# Patient Record
(demographics unavailable — no encounter records)

---

## 2025-03-12 NOTE — HISTORY OF PRESENT ILLNESS
[FreeTextEntry1] : FREDERICK STORM is a 24 year F presenting with symptomatic macromastia, requesting consultation for breast reduction surgery. Patient states that she suffers from long standing neck, back and shoulder pain, with bra strap grooving from her heavy breasts for many years. Patient complains that she has difficulty fitting into clothing and that her large breasts interfere with exercise. 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 She as well c/o poor posture due to her heavy breasts pulling her forward.    Patient has attempted conservative treatment i.e. supportive garments, over the counter pain medication,  without any resolution to the pain. These conservative treatments have been attempted for over 6 months. Her bra size has been stable for at least 6 months   PMHx- denied   PSHx- endoscopy    Currently taking - denied   Allergies- NKDA   Never smoker   Family history- denied    Work history - PCA at [employer]    She is unsure of her current bra size   She denies any previous breast imaging.

## 2025-03-12 NOTE — PHYSICAL EXAM
[NI] : Normal [de-identified] : NAD, AxOx3  [de-identified] : nonlabored breathing  [de-identified] : normal HR [de-identified] : RIGHT: SN-N 39.5, N-IMF 17, BW 17 LEFT: SN-N 40, N-IMF 20, BW 18 No masses  No nipple discharge nor retraction  Grade 3 ptosis bilaterally  Striae is present  Bra strap grooving is noted  [de-identified] : no edema  [de-identified] : as above [de-identified] : grossly intact [de-identified] : normal affect  12-Feb-2018

## 2025-03-12 NOTE — HISTORY OF PRESENT ILLNESS
[FreeTextEntry1] : FREDERICK STORM is a 24 year F presenting with symptomatic macromastia, requesting consultation for breast reduction surgery. Patient states that she suffers from long standing neck, back and shoulder pain, with bra strap grooving from her heavy breasts for many years. Patient complains that she has difficulty fitting into clothing and that her large breasts interfere with exercise. 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 She as well c/o poor posture due to her heavy breasts pulling her forward.    Patient has attempted conservative treatment i.e. supportive garments, over the counter pain medication,  without any resolution to the pain. These conservative treatments have been attempted for over 6 months. 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

## 2025-03-12 NOTE — PHYSICAL EXAM
[NI] : Normal [de-identified] : NAD, AxOx3  [de-identified] : nonlabored breathing  [de-identified] : normal HR [de-identified] : RIGHT: SN-N 39.5, N-IMF 17, BW 17 LEFT: SN-N 40, N-IMF 20, BW 18 No masses  No nipple discharge nor retraction  Grade 3 ptosis bilaterally  Striae is present  Bra strap grooving is noted  [de-identified] : no edema  [de-identified] : as above [de-identified] : grossly intact [de-identified] : normal affect

## 2025-03-12 NOTE — ADDENDUM
[FreeTextEntry1] :  I, Larisa Fontenot, documented this note as a scribe on behalf of Dr. Lauren Shikowitz-Behr, MD on 03/04/2025.

## 2025-03-12 NOTE — HISTORY OF PRESENT ILLNESS
[FreeTextEntry1] : FREDERICK STORM is a 24 year F presenting with symptomatic macromastia, requesting consultation for breast reduction surgery. Patient states that she suffers from long standing neck, back and shoulder pain, with bra strap grooving from her heavy breasts for many years. Patient complains that she has difficulty fitting into clothing and that her large breasts interfere with exercise. 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 She as well c/o poor posture due to her heavy breasts pulling her forward.    Patient has attempted conservative treatment i.e. supportive garments, over the counter pain medication,  without any resolution to the pain. These conservative treatments have been attempted for over 6 months. 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

## 2025-03-12 NOTE — HISTORY OF PRESENT ILLNESS
[FreeTextEntry1] : FREDERICK STORM is a 24 year F presenting with symptomatic macromastia, requesting consultation for breast reduction surgery. Patient states that she suffers from long standing neck, back and shoulder pain, with bra strap grooving from her heavy breasts for many years. Patient complains that she has difficulty fitting into clothing and that her large breasts interfere with exercise. 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 She as well c/o poor posture due to her heavy breasts pulling her forward.    Patient has attempted conservative treatment i.e. supportive garments, over the counter pain medication,  without any resolution to the pain. These conservative treatments have been attempted for over 6 months. 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

## 2025-03-12 NOTE — PHYSICAL EXAM
[NI] : Normal [de-identified] : NAD, AxOx3  [de-identified] : nonlabored breathing  [de-identified] : normal HR [de-identified] : RIGHT: SN-N 39.5, N-IMF 17, BW 17 LEFT: SN-N 40, N-IMF 20, BW 18 No masses  No nipple discharge nor retraction  Grade 3 ptosis bilaterally  Striae is present  Bra strap grooving is noted  [de-identified] : no edema  [de-identified] : as above [de-identified] : grossly intact [de-identified] : normal affect

## 2025-03-12 NOTE — END OF VISIT
[FreeTextEntry3] : All medical record entries made by the Scribe were at my, Dr. Lauren Shikowitz-Behr, MD, direction and personally dictated by me on 03/04/2025. I have reviewed the chart and agree that the record accurately reflects my personal performance of the history, physical exam, assessment and plan. I have also personally directed, reviewed, and agreed with the chart. [Time Spent: ___ minutes] : I have spent [unfilled] minutes of time on the encounter which excludes teaching and separately reported services.

## 2025-03-12 NOTE — PHYSICAL EXAM
[NI] : Normal [de-identified] : NAD, AxOx3  [de-identified] : nonlabored breathing  [de-identified] : normal HR [de-identified] : RIGHT: SN-N 39.5, N-IMF 17, BW 17 LEFT: SN-N 40, N-IMF 20, BW 18 No masses  No nipple discharge nor retraction  Grade 3 ptosis bilaterally  Striae is present  Bra strap grooving is noted  [de-identified] : no edema  [de-identified] : as above [de-identified] : grossly intact [de-identified] : normal affect

## 2025-07-10 NOTE — END OF VISIT
[FreeTextEntry3] : All medical record entries made by the Scribe were at my, Dr. Lauren Shikowitz-Behr, MD, direction and personally dictated by me on 07/08/2025. I have reviewed the chart and agree that the record accurately reflects my personal performance of the history, physical exam, assessment and plan. I have also personally directed, reviewed, and agreed with the chart.

## 2025-07-10 NOTE — PHYSICAL EXAM
[NI] : Normal [de-identified] : NAD, AxOX3  [de-identified] : nonlabored breathing  [de-identified] : Bilateral breast incisions clean, dry, and intact NAC viable bilaterally  Drain serous in output  no evidence of infection, fluid collection, or skin breakdown   [de-identified] : no edema  [de-identified] : normal affect

## 2025-07-10 NOTE — PHYSICAL EXAM
[NI] : Normal [de-identified] : NAD, AxOX3  [de-identified] : nonlabored breathing  [de-identified] : Bilateral breast incisions clean, dry, and intact NAC viable bilaterally  Drain serous in output  no evidence of infection, fluid collection, or skin breakdown   [de-identified] : no edema  [de-identified] : normal affect

## 2025-07-10 NOTE — ADDENDUM
[FreeTextEntry1] :  I, Larisa Fontenot, documented this note as a scribe on behalf of Dr. Lauren Shikowitz-Behr, MD on 07/08/2025.

## 2025-07-10 NOTE — REASON FOR VISIT
[Post Op: _________] : a [unfilled] post op visit [Spouse] : spouse I personally performed the service described in the documentation recorded by the scribe in my presence, and it accurately and completely records my words and actions.

## 2025-07-10 NOTE — PHYSICAL EXAM
[NI] : Normal [de-identified] : NAD, AxOX3  [de-identified] : nonlabored breathing  [de-identified] : Bilateral breast incisions clean, dry, and intact NAC viable bilaterally  Drain serous in output  no evidence of infection, fluid collection, or skin breakdown   [de-identified] : no edema  [de-identified] : normal affect

## 2025-07-10 NOTE — HISTORY OF PRESENT ILLNESS
[FreeTextEntry1] : FREDERICK STORM is an 24 year old female who presents today for first postoperative visit s/p Bilateral breast reduction on 7/3/25. Patient admits to discomfort but denies significant pain. As per patient, drains meet criteria for removal.  She denies fever, chills, LE edema, CP or SOB

## 2025-07-14 NOTE — HISTORY OF PRESENT ILLNESS
[FreeTextEntry1] : 24 year old female who presents today for first postoperative visit s/p Bilateral breast reduction on 7/3/25. Patient admits to discomfort but denies significant pain. She denies fever, chills, LE edema, CP or SOB. Pt admits to smoking marijuana daily, importance of smoking cessation emphasized to patient to ensure adequate healing.  Pathology reviewed-negative.

## 2025-07-14 NOTE — PHYSICAL EXAM
[de-identified] : b/l breasts soft, nt. incisions well healed. nipples sensate. good volume, projection, and symmetry.  [de-identified] : FROM b.l UE to 180 degrees

## 2025-07-18 NOTE — REASON FOR VISIT
[Post Op: _________] : a [unfilled] post op visit
Abdomen soft/No tenderness/No distension

## 2025-07-20 NOTE — END OF VISIT
[FreeTextEntry3] : All medical record entries made by the Scribe were at my, Dr. Lauren Shikowitz-Behr, MD, direction and personally dictated by me on 07/18/2025. I have reviewed the chart and agree that the record accurately reflects my personal performance of the history, physical exam, assessment and plan. I have also personally directed, reviewed, and agreed with the chart.

## 2025-07-20 NOTE — PHYSICAL EXAM
[NI] : Normal [de-identified] : NAd, AxOx3  [de-identified] : nonlabored breathing  [de-identified] :  no edema  [de-identified] : Bilateral breast incisions clean dry and intact Healing well  NAC viable bilaterally  Minimal superficial right Tpoint wound No rash No erythema  No infection   [de-identified] : normal affect

## 2025-07-20 NOTE — ADDENDUM
[FreeTextEntry1] :  I, Larisa Fontenot, documented this note as a scribe on behalf of Dr. Lauren Shikowitz-Behr, MD on 07/18/2025.

## 2025-07-20 NOTE — PHYSICAL EXAM
[NI] : Normal [de-identified] : NAd, AxOx3  [de-identified] : nonlabored breathing  [de-identified] : Bilateral breast incisions clean dry and intact Healing well  NAC viable bilaterally  Minimal superficial right Tpoint wound No rash No erythema  No infection   [de-identified] :  no edema  [de-identified] : normal affect

## 2025-07-20 NOTE — HISTORY OF PRESENT ILLNESS
[FreeTextEntry1] : FREDERICK STORM is an 24 year old female who presents today s/p Bilateral breast reduction on 7/3/25. Patient complaining about itching and skin peeling to her incisions. She otherwise has no other complaints.

## 2025-07-20 NOTE — PHYSICAL EXAM
[NI] : Normal [de-identified] : NAd, AxOx3  [de-identified] : nonlabored breathing  [de-identified] :  no edema  [de-identified] : Bilateral breast incisions clean dry and intact Healing well  NAC viable bilaterally  Minimal superficial right Tpoint wound No rash No erythema  No infection   [de-identified] : normal affect